# Patient Record
Sex: FEMALE | Race: BLACK OR AFRICAN AMERICAN | NOT HISPANIC OR LATINO | Employment: UNEMPLOYED | URBAN - METROPOLITAN AREA
[De-identification: names, ages, dates, MRNs, and addresses within clinical notes are randomized per-mention and may not be internally consistent; named-entity substitution may affect disease eponyms.]

---

## 2020-06-15 PROBLEM — Z01.419 ENCOUNTER FOR WELL WOMAN EXAM WITH ROUTINE GYNECOLOGICAL EXAM: Status: ACTIVE | Noted: 2020-06-15

## 2022-10-20 PROBLEM — N94.9 VAGINAL DISCOMFORT: Status: ACTIVE | Noted: 2022-10-20

## 2022-12-20 PROBLEM — N85.2 LARGE UTERUS: Status: RESOLVED | Noted: 2022-12-20 | Resolved: 2022-12-20

## 2022-12-20 PROBLEM — N81.11 CYSTOCELE, MIDLINE: Status: ACTIVE | Noted: 2022-12-20

## 2022-12-20 PROBLEM — N85.2 LARGE UTERUS: Status: ACTIVE | Noted: 2022-12-20

## 2022-12-20 PROBLEM — D21.9 FIBROIDS: Status: ACTIVE | Noted: 2022-12-20

## 2022-12-20 PROBLEM — N81.4 UTERINE PROLAPSE: Status: ACTIVE | Noted: 2022-12-20

## 2022-12-20 PROBLEM — N81.6 RECTOCELE: Status: ACTIVE | Noted: 2022-12-20

## 2023-02-09 ENCOUNTER — SOCIAL WORK (OUTPATIENT)
Dept: ADMINISTRATIVE | Facility: OTHER | Age: 58
End: 2023-02-09

## 2023-02-09 NOTE — PROGRESS NOTES
SW received pt's medical certification form from the pt. SW completed the demographic sheet/attached clinical note on pt's medical certification form and gave the form to MD for review/signature. SW received pt's completed form from MD and provide the original form to pt. SW scanned form into epic. No other needs identified at this time.    Soila Tarango,MSW  Pager#[phone]

## 2023-02-24 PROBLEM — Z96.0 URETERAL STENT PRESENT: Status: ACTIVE | Noted: 2023-02-24

## 2023-02-24 PROBLEM — N99.81 INTRAOPERATIVE URETERAL INJURY: Status: ACTIVE | Noted: 2023-02-24

## 2023-02-24 PROBLEM — Z90.722 STATUS POST TOTAL HYSTERECTOMY AND BILATERAL SALPINGO-OOPHORECTOMY (BSO): Status: ACTIVE | Noted: 2023-02-24

## 2023-02-24 PROBLEM — Z90.79 STATUS POST TOTAL HYSTERECTOMY AND BILATERAL SALPINGO-OOPHORECTOMY (BSO): Status: ACTIVE | Noted: 2023-02-24

## 2023-02-24 PROBLEM — Z90.710 STATUS POST TOTAL HYSTERECTOMY AND BILATERAL SALPINGO-OOPHORECTOMY (BSO): Status: ACTIVE | Noted: 2023-02-24

## 2023-05-01 PROBLEM — Z86.39 HISTORY OF DIABETES MELLITUS: Status: ACTIVE | Noted: 2023-05-01

## 2023-05-10 PROBLEM — N81.4 UTERINE PROLAPSE: Status: RESOLVED | Noted: 2022-12-20 | Resolved: 2023-05-10

## 2023-05-10 PROBLEM — Z96.0 URETERAL STENT PRESENT: Status: RESOLVED | Noted: 2023-02-24 | Resolved: 2023-05-10

## 2023-05-10 PROBLEM — N94.9 VAGINAL DISCOMFORT: Status: RESOLVED | Noted: 2022-10-20 | Resolved: 2023-05-10

## 2023-05-10 PROBLEM — N99.81 INTRAOPERATIVE URETERAL INJURY: Status: RESOLVED | Noted: 2023-02-24 | Resolved: 2023-05-10

## 2023-05-10 PROBLEM — N81.11 CYSTOCELE, MIDLINE: Status: RESOLVED | Noted: 2022-12-20 | Resolved: 2023-05-10

## 2023-05-10 PROBLEM — N81.6 RECTOCELE: Status: RESOLVED | Noted: 2022-12-20 | Resolved: 2023-05-10

## 2023-05-10 PROBLEM — D21.9 FIBROIDS: Status: RESOLVED | Noted: 2022-12-20 | Resolved: 2023-05-10

## 2023-05-10 PROBLEM — B37.31 CANDIDIASIS OF VULVA AND VAGINA: Status: ACTIVE | Noted: 2023-05-10

## 2023-08-18 PROBLEM — Z01.419 ENCOUNTER FOR WELL WOMAN EXAM WITH ROUTINE GYNECOLOGICAL EXAM: Status: RESOLVED | Noted: 2020-06-15 | Resolved: 2023-08-18
